# Patient Record
Sex: MALE | Race: ASIAN
[De-identification: names, ages, dates, MRNs, and addresses within clinical notes are randomized per-mention and may not be internally consistent; named-entity substitution may affect disease eponyms.]

---

## 2018-08-28 ENCOUNTER — HOSPITAL ENCOUNTER (EMERGENCY)
Dept: HOSPITAL 76 - ED | Age: 83
Discharge: ANOTHER HOSPITAL | End: 2018-08-28
Payer: COMMERCIAL

## 2018-08-28 ENCOUNTER — HOSPITAL ENCOUNTER (OUTPATIENT)
Dept: HOSPITAL 76 - EMS | Age: 83
Discharge: ANOTHER HOSPITAL | End: 2018-08-28
Attending: SURGERY
Payer: COMMERCIAL

## 2018-08-28 VITALS — DIASTOLIC BLOOD PRESSURE: 80 MMHG | SYSTOLIC BLOOD PRESSURE: 114 MMHG

## 2018-08-28 DIAGNOSIS — S02.0XXA: ICD-10-CM

## 2018-08-28 DIAGNOSIS — R55: Primary | ICD-10-CM
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
## 2018-08-28 DIAGNOSIS — I48.91: ICD-10-CM

## 2018-08-28 DIAGNOSIS — S06.4X9A: ICD-10-CM

## 2018-08-28 LAB
ALBUMIN DIAFP-MCNC: 4.1 G/DL (ref 3.2–5.5)
ALBUMIN/GLOB SERPL: 1.3 {RATIO} (ref 1–2.2)
ALP SERPL-CCNC: 45 IU/L (ref 42–121)
ALT SERPL W P-5'-P-CCNC: 15 IU/L (ref 10–60)
ANION GAP SERPL CALCULATED.4IONS-SCNC: 11 MMOL/L (ref 6–13)
AST SERPL W P-5'-P-CCNC: 21 IU/L (ref 10–42)
BASOPHILS NFR BLD AUTO: 0.1 10^3/UL (ref 0–0.1)
BASOPHILS NFR BLD AUTO: 0.5 %
BILIRUB BLD-MCNC: 1.9 MG/DL (ref 0.2–1)
BUN SERPL-MCNC: 25 MG/DL (ref 6–20)
CALCIUM UR-MCNC: 8.7 MG/DL (ref 8.5–10.3)
CHLORIDE SERPL-SCNC: 107 MMOL/L (ref 101–111)
CO2 SERPL-SCNC: 22 MMOL/L (ref 21–32)
CREAT SERPLBLD-SCNC: 1.1 MG/DL (ref 0.6–1.2)
EOSINOPHIL # BLD AUTO: 0.1 10^3/UL (ref 0–0.7)
EOSINOPHIL NFR BLD AUTO: 0.5 %
ERYTHROCYTE [DISTWIDTH] IN BLOOD BY AUTOMATED COUNT: 14.3 % (ref 12–15)
GFRSERPLBLD MDRD-ARVRAT: 64 ML/MIN/{1.73_M2} (ref 89–?)
GLOBULIN SER-MCNC: 3.2 G/DL (ref 2.1–4.2)
GLUCOSE SERPL-MCNC: 103 MG/DL (ref 70–100)
HGB UR QL STRIP: 14.7 G/DL (ref 14–18)
LIPASE SERPL-CCNC: 43 U/L (ref 22–51)
LYMPHOCYTES # SPEC AUTO: 1.8 10^3/UL (ref 1.5–3.5)
LYMPHOCYTES NFR BLD AUTO: 16 %
MAGNESIUM SERPL-MCNC: 2 MG/DL (ref 1.7–2.8)
MCH RBC QN AUTO: 30.7 PG (ref 27–31)
MCHC RBC AUTO-ENTMCNC: 34 G/DL (ref 32–36)
MCV RBC AUTO: 90.2 FL (ref 80–94)
MONOCYTES # BLD AUTO: 0.4 10^3/UL (ref 0–1)
MONOCYTES NFR BLD AUTO: 3.9 %
NEUTROPHILS # BLD AUTO: 9.1 10^3/UL (ref 1.5–6.6)
NEUTROPHILS # SNV AUTO: 11.5 X10^3/UL (ref 4.8–10.8)
NEUTROPHILS NFR BLD AUTO: 79.1 %
PDW BLD AUTO: 8.1 FL (ref 7.4–11.4)
PHOSPHATE BLD-MCNC: 3.3 MG/DL (ref 2.5–4.6)
PLATELET # BLD: 214 10^3/UL (ref 130–450)
PROT SPEC-MCNC: 7.3 G/DL (ref 6.7–8.2)
RBC MAR: 4.79 10^6/UL (ref 4.7–6.1)
SODIUM SERPLBLD-SCNC: 140 MMOL/L (ref 135–145)
T4 FREE SERPL-MCNC: 0.97 NG/DL (ref 0.58–1.64)
TSH SERPL-ACNC: 1.61 UIU/ML (ref 0.34–5.6)

## 2018-08-28 PROCEDURE — 72125 CT NECK SPINE W/O DYE: CPT

## 2018-08-28 PROCEDURE — 96365 THER/PROPH/DIAG IV INF INIT: CPT

## 2018-08-28 PROCEDURE — 84484 ASSAY OF TROPONIN QUANT: CPT

## 2018-08-28 PROCEDURE — 99285 EMERGENCY DEPT VISIT HI MDM: CPT

## 2018-08-28 PROCEDURE — 36415 COLL VENOUS BLD VENIPUNCTURE: CPT

## 2018-08-28 PROCEDURE — 84439 ASSAY OF FREE THYROXINE: CPT

## 2018-08-28 PROCEDURE — 93005 ELECTROCARDIOGRAM TRACING: CPT

## 2018-08-28 PROCEDURE — 85025 COMPLETE CBC W/AUTO DIFF WBC: CPT

## 2018-08-28 PROCEDURE — 84443 ASSAY THYROID STIM HORMONE: CPT

## 2018-08-28 PROCEDURE — 70450 CT HEAD/BRAIN W/O DYE: CPT

## 2018-08-28 PROCEDURE — 80053 COMPREHEN METABOLIC PANEL: CPT

## 2018-08-28 PROCEDURE — 83735 ASSAY OF MAGNESIUM: CPT

## 2018-08-28 PROCEDURE — 84100 ASSAY OF PHOSPHORUS: CPT

## 2018-08-28 PROCEDURE — 99284 EMERGENCY DEPT VISIT MOD MDM: CPT

## 2018-08-28 PROCEDURE — 99283 EMERGENCY DEPT VISIT LOW MDM: CPT

## 2018-08-28 PROCEDURE — 71045 X-RAY EXAM CHEST 1 VIEW: CPT

## 2018-08-28 PROCEDURE — 83690 ASSAY OF LIPASE: CPT

## 2018-08-28 NOTE — ED PHYSICIAN DOCUMENTATION
PD HPI SYNCOPE





- Stated complaint


Stated Complaint: SYNCOPAL EPISODE





- Chief complaint


Chief Complaint: Neuro





- History obtained from


History obtained from: Patient, Family





- History of Present Illness


Witnessed: Witnessed


Timing - onset: Today


Duration: Minutes (2)


Preceding symptoms: None


Associated symptoms: None.  No: Seizure, Incontinant of urine, Incontinant of 

stool, Headache, Vision changes, Chest pain, Palpitations, Diaphoresis, Dyspnea

, Nausea / vomiting, Abdominal pain


Contributing factors: No: Recent med change, Decreased PO intake, Noxious 

stimulae, Emotional upset, Just stood up, Exertion


Injury occurred: Fell, Head injury.  No: Neck injury, Bit tongue


Pain level max: 3


Pain level now: 3


Treatment PTA: No: Dextrose, Narcan, Fluids, O2, Cardiac meds, CPR, C spine 

precautions


Similar symptoms before: Other (states had a similar episode 9 months ago, 

walking and had drop syncope. Had a 24 hour holter monitor with no arrhythmia 

found.)


Recently seen: Not recently seen





- Additional information


Additional information: 





Found to be in new onset afib with EMS today. 





Review of Systems


Ten Systems: 10 systems reviewed and negative


Constitutional: denies: Fever, Chills


Ears: denies: Ear pain


Nose: denies: Rhinorrhea / runny nose, Congestion


Throat: denies: Sore throat


Cardiac: denies: Palpitations


Respiratory: denies: Dyspnea, Cough


GI: denies: Abdominal Pain, Nausea, Vomiting, Diarrhea


Skin: denies: Rash


Musculoskeletal: denies: Neck pain, Back pain


Neurologic: denies: Focal weakness, Numbness, Confused, Altered mental status





PD PAST MEDICAL HISTORY





- Past Medical History


Past Medical History: No





- Past Surgical History


Past Surgical History: No





- Present Medications


Home Medications: 


 Ambulatory Orders











 Medication  Instructions  Recorded  Confirmed


 


No Known Home Medications [No  08/28/18 08/28/18





Known Home Medications]   














- Allergies


Allergies/Adverse Reactions: 


 Allergies











Allergy/AdvReac Type Severity Reaction Status Date / Time


 


No Known Drug Allergies Allergy   Verified 08/28/18 13:52














- Social History


Does the pt smoke?: No


Smoking Status: Never smoker


Does the pt drink ETOH?: No


Does the pt have substance abuse?: No





- Immunizations


Immunizations are current?: Yes





PD ED PE NORMAL





- Vitals


Vital signs reviewed: Yes





- General


General: Alert and oriented X 3, No acute distress





- HEENT


HEENT: Atraumatic, PERRL, Ears normal, Moist mucous membranes, Pharynx benign





- Neck


Neck: Supple, no meningeal sign, No bony TTP





- Cardiac


Cardiac: Other (irregular)





- Respiratory


Respiratory: No respiratory distress, Clear bilaterally





- Abdomen


Abdomen: Soft, Non tender, Non distended





- Derm


Derm: Warm and dry





- Extremities


Extremities: No edema, No calf tenderness / cord





- Neuro


Neuro: Alert and oriented X 3





Results





- Vitals


Vitals: 


 Vital Signs - 24 hr











  08/28/18





  13:49


 


Temperature 36.1 C L


 


Heart Rate 105 H


 


Respiratory 19





Rate 


 


Blood Pressure 114/80


 


O2 Saturation 99








 Oxygen











O2 Source                      Room air

















- EKG (time done)


  ** 1342


Rate: Rate (enter#) (100)


Rhythm: Atrial fibrillation


Axis: Normal


Intervals: Normal DE


QRS: Normal


Ischemia: Normal ST segments





- Labs


Labs: 


 Laboratory Tests











  08/28/18 08/28/18 08/28/18





  14:10 14:10 14:10


 


WBC  11.5 H  


 


RBC  4.79  


 


Hgb  14.7  


 


Hct  43.2  


 


MCV  90.2  


 


MCH  30.7  


 


MCHC  34.0  


 


RDW  14.3  


 


Plt Count  214  


 


MPV  8.1  


 


Neut # (Auto)  9.1 H  


 


Lymph # (Auto)  1.8  


 


Mono # (Auto)  0.4  


 


Eos # (Auto)  0.1  


 


Baso # (Auto)  0.1  


 


Absolute Nucleated RBC  0.00  


 


Nucleated RBC %  0.0  


 


Sodium   140 


 


Potassium   3.8 


 


Chloride   107 


 


Carbon Dioxide   22 


 


Anion Gap   11.0 


 


BUN   25 H 


 


Creatinine   1.1 


 


Estimated GFR (MDRD)   64 L 


 


Glucose   103 H 


 


Calcium   8.7 


 


Phosphorus   3.3 


 


Magnesium   2.0 


 


Total Bilirubin   1.9 H 


 


AST   21 


 


ALT   15 


 


Alkaline Phosphatase   45 


 


Troponin I    < 0.04


 


Total Protein   7.3 


 


Albumin   4.1 


 


Globulin   3.2 


 


Albumin/Globulin Ratio   1.3 


 


Lipase   43 


 


TSH   


 


Free T4   














  08/28/18





  14:10


 


WBC 


 


RBC 


 


Hgb 


 


Hct 


 


MCV 


 


MCH 


 


MCHC 


 


RDW 


 


Plt Count 


 


MPV 


 


Neut # (Auto) 


 


Lymph # (Auto) 


 


Mono # (Auto) 


 


Eos # (Auto) 


 


Baso # (Auto) 


 


Absolute Nucleated RBC 


 


Nucleated RBC % 


 


Sodium 


 


Potassium 


 


Chloride 


 


Carbon Dioxide 


 


Anion Gap 


 


BUN 


 


Creatinine 


 


Estimated GFR (MDRD) 


 


Glucose 


 


Calcium 


 


Phosphorus 


 


Magnesium 


 


Total Bilirubin 


 


AST 


 


ALT 


 


Alkaline Phosphatase 


 


Troponin I 


 


Total Protein 


 


Albumin 


 


Globulin 


 


Albumin/Globulin Ratio 


 


Lipase 


 


TSH  1.61


 


Free T4  0.97














- Rads (name of study)


  ** head CT


Radiology: Prelim report reviewed, EMP read contemporaneously, See rad report (

Left parietal bone fracture with associated epidural collection and suspected 

involvement of the sagittal sinus. The epidural collection may therefore be 

venous in nature. )





  ** cxr


Radiology: Prelim report reviewed, EMP read contemporaneously, See rad report (

No acute cardiopulmonary abnormality. )





  ** Ct c-spine


Radiology: Prelim report reviewed, EMP read contemporaneously, See rad report (

No traumatic injury to the cervical spine. )





PD MEDICAL DECISION MAKING





- ED course


Complexity details: reviewed old records, reviewed results, re-evaluated patient

, considered differential, d/w patient, d/w family, d/w consultant


ED course: 





Patient is an 83-year-old male visiting from [facility] who had a drop attack 

syncope today.  Last approximately 2-3 minutes.  Concerning for arrhythmia.  On 

telemetry in the emergency department shortly after arrival had an 8-1/2 second 

sinus arrest.  Nearly passed out again.  Contacted [facility] in [place] 

for transfer.  [facility]'s EP lab is closed for the week.  They 

prefer to go to [place] rather than [facility] as it is closer to 

home and their friends have a house there. External pacing pads were placed on 

the patient. Magnesium given.  He is not on any meds at home.





Head CT returned and he was found to have a parietal skull fracture with an 

associated epidural hematoma.  Involves the sagittal sinus.  Might be a venous 

bleed.  Sent back to CT for CT scan of the neck.  Patient will be transferred 

to [facility] instead. Discussed the case with Dr. Green, (ED) 

who graciously accepts in transfer. 





This document was made in part using voice recognition software. While efforts 

are made to proofread this document, sound alike and grammatical errors may 

occur.





- Sepsis Event


Vital Signs: 


 Vital Signs - 24 hr











  08/28/18





  13:49


 


Temperature 36.1 C L


 


Heart Rate 105 H


 


Respiratory 19





Rate 


 


Blood Pressure 114/80


 


O2 Saturation 99








 Oxygen











O2 Source                      Room air

















Departure





- Departure


Disposition: 02 Transfer [facility]


Clinical Impression: 


 Sinus arrest, Atrial fibrillation, new onset, Epidural hematoma





Syncope


Qualifiers:


 Syncope type: unspecified Qualified Code(s): R55 - Syncope and collapse





Skull fracture


Qualifiers:


 Encounter type: initial encounter Skull bone/location: unspecified skull bone 

Fracture type: closed Qualified Code(s): S02.91XA - Unspecified fracture of 

skull, initial encounter for closed fracture





Condition: Stable


Discharge Date/Time: 08/28/18 16:02

## 2018-08-28 NOTE — CT REPORT
Reason:  fall, head injury

Procedure Date:  08/28/2018   

Accession Number:  [id number] / [id number]                    

Procedure:  CT  - Head W/O CPT Code:  

 

FULL RESULT:

 

 

EXAM:

CT HEAD

 

EXAM DATE: 8/28/2018 02:28 PM.

 

CLINICAL HISTORY: Fall with syncope and trauma to the head.

 

COMPARISON: None.

 

TECHNIQUE: Multiaxial CT images were obtained from the foramen magnum to 

the vertex. Reformats: Sagittal and coronal. IV contrast: None.

 

In accordance with CT protocol optimization, one or more of the following 

dose reduction techniques were utilized for this exam: automated exposure 

control, adjustment of mA and/or KV based on patient size, or use of 

iterative reconstructive technique.

 

FINDINGS:

 

There is a left parietal bone fracture which extends from the left 

frontoparietal region posteriorly along the vertex crossing midline and 

likely involving the sagittal sinus with an 8 mm thick parasagittal 

epidural collection with minimal mass effect on nearby cortex. Expected 

soft tissue swelling is seen externally.

 

Parenchyma: No intraparenchymal hemorrhage. No evidence of mass, midline 

shift, or CT findings of infarction. Gray-white differentiation is 

distinct.

 

Ventricles: Normal in size and position.

 

Sinuses and Orbits: Imaged paranasal sinuses, orbits, and mastoids show 

no significant abnormality.

IMPRESSION: Left parietal bone fracture with associated epidural 

collection and suspected involvement of the sagittal sinus. The epidural 

collection may therefore be venous in nature.

 

CRITICAL RESULT: The findings were discussed with Dr. Martinez on 8/28/2018 

at 2:50 PM.

[facility]

## 2018-08-28 NOTE — CT REPORT
Reason:  syncope, skull fracture

Procedure Date:  08/28/2018   

Accession Number:  [id number] / [id number]                    

Procedure:  CT  - Cervical Spine W/O CPT Code:  

 

FULL RESULT:

 

 

EXAM:

CT CERVICAL SPINE WITHOUT CONTRAST

 

DATE: 8/28/2018 03:38 PM.

 

HISTORY: Fall, hit head, skull fracture.

 

COMPARISONS: None.

 

TECHNIQUE: Thin-section axial images were acquired of the cervical spine 

without contrast. Post-processing: Coronal and sagittal reformats. Other: 

None.

 

In accordance with CT protocol optimization, one or more of the following 

dose reduction techniques were utilized for this exam: automated exposure 

control, adjustment of mA and/or KV based on patient size, or use of 

iterative reconstructive technique.

 

FINDINGS:

Alignment: No scoliosis or spondylolisthesis.

 

Bones: No fracture or bone lesion.

 

Interspace Levels/Facets:

C1-C2: Unremarkable.

 

C2-C3: Loss of disk space height with endplate spurring.

 

C3-C4: Loss of disk space height with endplate spurring.

 

C4-C5: Mild loss of disk space height with minimal posterior 

disk/osteophyte complex.

 

C5-C6: Mild loss of disk space height with minimal posterior 

disk/osteophyte complex.

 

C6-C7: Mild loss of disk space height with minimal posterior 

disk/osteophyte complex.

 

C7-T1: Unremarkable.

 

Musculature: Normal. No fatty atrophy.

 

Other: The paravertebral and prevertebral soft tissues are unremarkable. 

The lung apices are clear.

IMPRESSION: No traumatic injury to the cervical spine.

 

[facility]

## 2018-08-28 NOTE — XRAY REPORT
Reason:  chest pain

Procedure Date:  08/28/2018   

Accession Number:  [id number] / [id number]                    

Procedure:  XR  - Chest 1 View X-Ray CPT Code:  48465

 

FULL RESULT:

 

 

EXAM:

CHEST RADIOGRAPHY

 

EXAM DATE: 8/28/2018 02:35 PM.

 

CLINICAL HISTORY: Chest pain.

 

COMPARISON: None.

 

TECHNIQUE: 1 view.

 

FINDINGS:

Lungs/Pleura: No focal opacities evident. No pleural effusion. No 

pneumothorax.

 

Mediastinum: Within exam limitations, the cardiomediastinal contour is 

normal.

 

Other: None.

 

IMPRESSION: No acute cardiopulmonary abnormality.

 

[facility]IA